# Patient Record
Sex: FEMALE | Race: BLACK OR AFRICAN AMERICAN
[De-identification: names, ages, dates, MRNs, and addresses within clinical notes are randomized per-mention and may not be internally consistent; named-entity substitution may affect disease eponyms.]

---

## 2019-09-28 ENCOUNTER — HOSPITAL ENCOUNTER (EMERGENCY)
Dept: HOSPITAL 47 - EC | Age: 24
Discharge: HOME | End: 2019-09-28
Payer: COMMERCIAL

## 2019-09-28 VITALS — RESPIRATION RATE: 18 BRPM

## 2019-09-28 VITALS — SYSTOLIC BLOOD PRESSURE: 108 MMHG | HEART RATE: 90 BPM | DIASTOLIC BLOOD PRESSURE: 70 MMHG

## 2019-09-28 VITALS — TEMPERATURE: 98.2 F

## 2019-09-28 DIAGNOSIS — Z3A.00: ICD-10-CM

## 2019-09-28 DIAGNOSIS — O99.89: ICD-10-CM

## 2019-09-28 DIAGNOSIS — O23.40: ICD-10-CM

## 2019-09-28 DIAGNOSIS — O21.9: Primary | ICD-10-CM

## 2019-09-28 DIAGNOSIS — Z79.899: ICD-10-CM

## 2019-09-28 DIAGNOSIS — R19.7: ICD-10-CM

## 2019-09-28 LAB
ALBUMIN SERPL-MCNC: 4.7 G/DL (ref 3.5–5)
ALP SERPL-CCNC: 42 U/L (ref 38–126)
ALT SERPL-CCNC: 25 U/L (ref 9–52)
ANION GAP SERPL CALC-SCNC: 12 MMOL/L
AST SERPL-CCNC: 26 U/L (ref 14–36)
BASOPHILS # BLD AUTO: 0.1 K/UL (ref 0–0.2)
BASOPHILS NFR BLD AUTO: 1 %
BUN SERPL-SCNC: 5 MG/DL (ref 7–17)
CALCIUM SPEC-MCNC: 9.9 MG/DL (ref 8.4–10.2)
CHLORIDE SERPL-SCNC: 101 MMOL/L (ref 98–107)
CO2 SERPL-SCNC: 25 MMOL/L (ref 22–30)
EOSINOPHIL # BLD AUTO: 0.1 K/UL (ref 0–0.7)
EOSINOPHIL NFR BLD AUTO: 1 %
ERYTHROCYTE [DISTWIDTH] IN BLOOD BY AUTOMATED COUNT: 4.54 M/UL (ref 3.8–5.4)
ERYTHROCYTE [DISTWIDTH] IN BLOOD: 12.5 % (ref 11.5–15.5)
GLUCOSE SERPL-MCNC: 104 MG/DL (ref 74–99)
HCG SERPL-MCNC: (no result) MIU/ML
HCT VFR BLD AUTO: 39.9 % (ref 34–46)
HGB BLD-MCNC: 13.7 GM/DL (ref 11.4–16)
KETONES UR QL STRIP.AUTO: (no result)
LYMPHOCYTES # SPEC AUTO: 1.9 K/UL (ref 1–4.8)
LYMPHOCYTES NFR SPEC AUTO: 19 %
MCH RBC QN AUTO: 30.3 PG (ref 25–35)
MCHC RBC AUTO-ENTMCNC: 34.4 G/DL (ref 31–37)
MCV RBC AUTO: 87.8 FL (ref 80–100)
MONOCYTES # BLD AUTO: 0.6 K/UL (ref 0–1)
MONOCYTES NFR BLD AUTO: 6 %
NEUTROPHILS # BLD AUTO: 7.2 K/UL (ref 1.3–7.7)
NEUTROPHILS NFR BLD AUTO: 72 %
PH UR: 6.5 [PH] (ref 5–8)
PLATELET # BLD AUTO: 186 K/UL (ref 150–450)
POTASSIUM SERPL-SCNC: 4.1 MMOL/L (ref 3.5–5.1)
PROT SERPL-MCNC: 8.9 G/DL (ref 6.3–8.2)
SODIUM SERPL-SCNC: 138 MMOL/L (ref 137–145)
SP GR UR: 1.01 (ref 1–1.03)
SQUAMOUS UR QL AUTO: 1 /HPF (ref 0–4)
UROBILINOGEN UR QL STRIP: <2 MG/DL (ref ?–2)
WBC # BLD AUTO: 10 K/UL (ref 3.8–10.6)
WBC #/AREA URNS HPF: 4 /HPF (ref 0–5)

## 2019-09-28 PROCEDURE — 36415 COLL VENOUS BLD VENIPUNCTURE: CPT

## 2019-09-28 PROCEDURE — 81001 URINALYSIS AUTO W/SCOPE: CPT

## 2019-09-28 PROCEDURE — 84702 CHORIONIC GONADOTROPIN TEST: CPT

## 2019-09-28 PROCEDURE — 80053 COMPREHEN METABOLIC PANEL: CPT

## 2019-09-28 PROCEDURE — 96360 HYDRATION IV INFUSION INIT: CPT

## 2019-09-28 PROCEDURE — 99284 EMERGENCY DEPT VISIT MOD MDM: CPT

## 2019-09-28 PROCEDURE — 85025 COMPLETE CBC W/AUTO DIFF WBC: CPT

## 2019-09-28 PROCEDURE — 96361 HYDRATE IV INFUSION ADD-ON: CPT

## 2019-09-28 NOTE — ED
Nausea/Vomiting/Diarrhea HPI





- General


Chief complaint: Nausea/Vomiting/Diarrhea


Stated complaint: vomiting


Time Seen by Provider: 09/28/19 14:05


Source: patient


Mode of arrival: ambulatory


Limitations: no limitations





- History of Present Illness


Initial comments: 


24-year-old female presents emergency department for chief complaint of vomiting

in pregnancy.  Patient states she found she was pregnant 2 weeks prior from 

urinary pregnancy test P patient states that she has history of hyperemesis 

gravidarum with previous pregnancies.  Patient denies any pelvic pain vaginal 

bleeding.  Patient denies dysuria urgency hematuria frequency hematuria or back 

pain.  Patient states that she was concerned she was giving dehydrated as she is

not pale to tolerate very much oral intake for the past week and presents 

emergency department for evaluation.  Remaining review of systems negative. Upon

arrival patient appears well, VS stable.








- Related Data


                                Home Medications











 Medication  Instructions  Recorded  Confirmed


 


Acetaminophen [Tylenol] 650 mg PO Q4H 05/05/16 05/05/16








                                  Previous Rx's











 Medication  Instructions  Recorded


 


Acetaminophen-Codeine 300-30mg 1 tab PO Q4H PRN #30 tablet 05/07/16





[Tylenol #3]  


 


Cephalexin [Keflex] 500 mg PO Q12HR 5 Days #10 cap 09/28/19











                                    Allergies











Allergy/AdvReac Type Severity Reaction Status Date / Time


 


No Known Allergies Allergy   Verified 09/28/19 13:44














Review of Systems


ROS Statement: 


Those systems with pertinent positive or pertinent negative responses have been 

documented in the HPI.





ROS Other: All systems not noted in ROS Statement are negative.





Past Medical History


Past Medical History: No Reported History


Additional Past Medical History / Comment(s): hyperemesis grav. with pregnancies


History of Any Multi-Drug Resistant Organisms: None Reported


Past Surgical History: Adenoidectomy


Additional Past Surgical History / Comment(s): at age5


Past Anesthesia/Blood Transfusion Reactions: No Reported Reaction


Past Psychological History: No Psychological Hx Reported


Smoking Status: Never smoker


Past Alcohol Use History: None Reported


Past Drug Use History: None Reported





- Past Family History


  ** Father


Family Medical History: No Reported History





General Exam





- General Exam Comments


Initial Comments: 


General:  The patient is awake and alert, in no distress, and does not appear 

acutely ill. 


Eye:  Pupils are equal, round and reactive to light, extra-ocular movements are 

intact.  No nystagmus.  There is normal conjunctiva bilaterally.  No signs of 

icterus.  


Ears, nose, mouth and throat:  There are moist mucous membranes and no oral 

lesions. 


Neck:  The neck is supple, there is no tenderness or JVD.  


Cardiovascular:  There is a regular rate and rhythm. No murmur, rub or gallop is

 appreciated.


Respiratory:  Lungs are clear to auscultation, respirations are non-labored, 

breath sounds are equal.  No wheezes, stridor, rales, or rhonchi.


Gastrointestinal:  Soft, non-distended, non-tender abdomen without masses or 

organomegaly noted. There is no rebound or guarding present.  


Musculoskeletal:  Normal ROM, no tenderness.  Strength 5/5. Sensation intact. 

Radial pulses equal bilaterally 2+.  


Neurological:  A&O x 3. CN II-XII intact grossly, There are no obvious motor or 

sensory deficits. Coordination appears grossly intact. Speech is normal.


Skin:  Skin is warm and dry and no rashes or lesions are noted. 


Psychiatric:  Cooperative, appropriate mood & affect, normal judgment.  





Limitations: no limitations





Course


                                   Vital Signs











  09/28/19 09/28/19 09/28/19





  13:39 15:09 16:50


 


Temperature 98.2 F  


 


Pulse Rate 84 80 90


 


Respiratory 16 18 18





Rate   


 


Blood Pressure 99/62 121/70 108/70


 


O2 Sat by Pulse 100 100 100





Oximetry   














Medical Decision Making





- Medical Decision Making


A well-appearing 24-year-old female currently pregnant, with positive pregnancy 

test 2 weeks ago and.  In mid August.  She denies any pelvic complaints 

including cramping abdominal pain or vaginal bleeding patient states that she 

has had hyperemesis gravidarum with previous pregnancies she was concerned she 

was getting dehydrated.  Patient has +1 ketones on urinalysis.  Patient has kristin

st mucous membranes and appears well urinalysis revealed, leukocyte esterase 

patient denies any urinary symptoms we'll treat given patient is currently 

pregnant.  She had benign abdominal exam.  Given IV hydration, no active 

vomiting the emergency department.  Resting comfortably.  At this time discussed

 the case Dr. Karthik burgos patient is stable for discharge with OB/GYN follow-up

 with Dr. Cole.








- Lab Data


Result diagrams: 


                                 09/28/19 15:04





                                 09/28/19 15:04


                                   Lab Results











  09/28/19 09/28/19 09/28/19 Range/Units





  15:00 15:04 15:04 


 


WBC   10.0   (3.8-10.6)  k/uL


 


RBC   4.54   (3.80-5.40)  m/uL


 


Hgb   13.7   (11.4-16.0)  gm/dL


 


Hct   39.9   (34.0-46.0)  %


 


MCV   87.8   (80.0-100.0)  fL


 


MCH   30.3   (25.0-35.0)  pg


 


MCHC   34.4   (31.0-37.0)  g/dL


 


RDW   12.5   (11.5-15.5)  %


 


Plt Count   186   (150-450)  k/uL


 


Neutrophils %   72   %


 


Lymphocytes %   19   %


 


Monocytes %   6   %


 


Eosinophils %   1   %


 


Basophils %   1   %


 


Neutrophils #   7.2   (1.3-7.7)  k/uL


 


Lymphocytes #   1.9   (1.0-4.8)  k/uL


 


Monocytes #   0.6   (0-1.0)  k/uL


 


Eosinophils #   0.1   (0-0.7)  k/uL


 


Basophils #   0.1   (0-0.2)  k/uL


 


Sodium    138  (137-145)  mmol/L


 


Potassium    4.1  (3.5-5.1)  mmol/L


 


Chloride    101  ()  mmol/L


 


Carbon Dioxide    25  (22-30)  mmol/L


 


Anion Gap    12  mmol/L


 


BUN    5 L  (7-17)  mg/dL


 


Creatinine    0.75  (0.52-1.04)  mg/dL


 


Est GFR (CKD-EPI)AfAm    >90  (>60 ml/min/1.73 sqM)  


 


Est GFR (CKD-EPI)NonAf    >90  (>60 ml/min/1.73 sqM)  


 


Glucose    104 H  (74-99)  mg/dL


 


Calcium    9.9  (8.4-10.2)  mg/dL


 


Total Bilirubin    0.5  (0.2-1.3)  mg/dL


 


AST    26  (14-36)  U/L


 


ALT    25  (9-52)  U/L


 


Alkaline Phosphatase    42  ()  U/L


 


Total Protein    8.9 H  (6.3-8.2)  g/dL


 


Albumin    4.7  (3.5-5.0)  g/dL


 


HCG, Quant    20645.4  mIU/mL


 


Urine Color  Yellow    


 


Urine Appearance  Clear    (Clear)  


 


Urine pH  6.5    (5.0-8.0)  


 


Ur Specific Gravity  1.007    (1.001-1.035)  


 


Urine Protein  Negative    (Negative)  


 


Urine Glucose (UA)  Negative    (Negative)  


 


Urine Ketones  1+ H    (Negative)  


 


Urine Blood  Negative    (Negative)  


 


Urine Nitrite  Negative    (Negative)  


 


Urine Bilirubin  Negative    (Negative)  


 


Urine Urobilinogen  <2.0    (<2.0)  mg/dL


 


Ur Leukocyte Esterase  Moderate H    (Negative)  


 


Urine WBC  4    (0-5)  /hpf


 


Ur Squamous Epith Cells  1    (0-4)  /hpf


 


Urine Bacteria  Rare H    (None)  /hpf














Disposition


Clinical Impression: 


 Vomiting during pregnancy, UTI (urinary tract infection)





Disposition: HOME SELF-CARE


Condition: Good


Additional Instructions: 


Please use medication as discussed.  Please follow-up with OBGYN in next 1-2 

weeks.  Please return to emergency room if the symptoms increase or worsen or 

for any other concerns.


Prescriptions: 


Cephalexin [Keflex] 500 mg PO Q12HR 5 Days #10 cap


Is patient prescribed a controlled substance at d/c from ED?: No


Referrals: 


Gavin Salazar MD [Primary Care Provider] - 1-2 days


Time of Disposition: 16:56